# Patient Record
Sex: MALE | Race: WHITE | NOT HISPANIC OR LATINO
[De-identification: names, ages, dates, MRNs, and addresses within clinical notes are randomized per-mention and may not be internally consistent; named-entity substitution may affect disease eponyms.]

---

## 2023-02-25 PROBLEM — Z00.00 ENCOUNTER FOR PREVENTIVE HEALTH EXAMINATION: Status: ACTIVE | Noted: 2023-02-25

## 2023-02-27 ENCOUNTER — APPOINTMENT (OUTPATIENT)
Dept: ORTHOPEDIC SURGERY | Facility: CLINIC | Age: 73
End: 2023-02-27
Payer: MEDICARE

## 2023-02-27 VITALS — BODY MASS INDEX: 23.34 KG/M2 | WEIGHT: 154 LBS | HEIGHT: 68 IN

## 2023-02-27 DIAGNOSIS — E78.00 PURE HYPERCHOLESTEROLEMIA, UNSPECIFIED: ICD-10-CM

## 2023-02-27 DIAGNOSIS — J44.9 CHRONIC OBSTRUCTIVE PULMONARY DISEASE, UNSPECIFIED: ICD-10-CM

## 2023-02-27 PROCEDURE — 99204 OFFICE O/P NEW MOD 45 MIN: CPT

## 2023-02-27 PROCEDURE — 73590 X-RAY EXAM OF LOWER LEG: CPT | Mod: RT

## 2023-02-27 RX ORDER — OXYCODONE AND ACETAMINOPHEN 5; 325 MG/1; MG/1
5-325 TABLET ORAL
Qty: 30 | Refills: 0 | Status: ACTIVE | COMMUNITY
Start: 2023-02-27 | End: 1900-01-01

## 2023-02-27 NOTE — ASSESSMENT
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  Did well until this injury 4 days ago while skiing.  Splinted in [place] here for follow-up.  X-ray showing nondisplaced shaft fracture immediately proximal to the old healed fracture.  Continued nonoperative treatment advised.  This appears stable 4 days out from injury with simple AO splint.  Discussed witching to long-leg splint but I think reasonable to remain with this immobilization for now.  Nonweightbearing with crutches.  Aspirin for DVT prophylaxis.  Percocet renewed.  Advise repeat x-ray in a week with the foot and ankle specialist.

## 2023-02-27 NOTE — HISTORY OF PRESENT ILLNESS
[Sudden] : sudden [9] : 9 [8] : 8 [Localized] : localized [Constant] : constant [Rest] : rest [Walking] : walking [] : yes [de-identified] : 02/27/2023 pt presents here today with right calf pain after skiing in [place] and fell on 02/23/23 [FreeTextEntry1] : right calf  [FreeTextEntry5] : fell while skiing  [FreeTextEntry9] : oxycodone [de-identified] : x rays done at [facility] [de-identified] : nothing

## 2023-02-27 NOTE — IMAGING
[de-identified] : RLE skin intact\par TTP midshaft tibia\par NVID\par Calf supple [Right] : right tibia/fibula [The fracture is in acceptable alignment. There is progression in healing seen] : The fracture is in acceptable alignment. There is progression in healing seen

## 2023-03-06 ENCOUNTER — APPOINTMENT (OUTPATIENT)
Dept: ORTHOPEDIC SURGERY | Facility: CLINIC | Age: 73
End: 2023-03-06
Payer: MEDICARE

## 2023-03-06 VITALS — HEIGHT: 68 IN | WEIGHT: 155 LBS | BODY MASS INDEX: 23.49 KG/M2

## 2023-03-06 DIAGNOSIS — S82.201A UNSPECIFIED FRACTURE OF SHAFT OF RIGHT TIBIA, INITIAL ENCOUNTER FOR CLOSED FRACTURE: ICD-10-CM

## 2023-03-06 PROCEDURE — 99214 OFFICE O/P EST MOD 30 MIN: CPT | Mod: 25

## 2023-03-06 PROCEDURE — L4361: CPT | Mod: RT,KX

## 2023-03-06 PROCEDURE — 27750 TREATMENT OF TIBIA FRACTURE: CPT

## 2023-03-06 NOTE — PHYSICAL EXAM
[Right] : right foot and ankle [5___] : [facility] 5[unfilled]/5 [2+] : dorsalis pedis pulse: 2+ [] : ambulation with crutches [FreeTextEntry3] : no sig swelling\par prior coverage procedures no activity infection [FreeTextEntry8] : ttp anterior tibia\par no sig ankle ttp [de-identified] : plantar flexion 20 degrees [TWNoteComboBox7] : dorsiflexion 5 degrees

## 2023-03-06 NOTE — DATA REVIEWED
[Outside X-rays] : outside x-rays [Right] : of the right [Lower Extremities] : lower extremities [I reviewed the films/CD and additionally noted] : I reviewed the films/CD and additionally noted [FreeTextEntry1] : non displaced tibia shaft fx just at and prox to prior fx

## 2023-03-06 NOTE — ASSESSMENT
[FreeTextEntry1] : wbat\par cam boot\par ice/elevate\par nsaids/tylenol prn\par advised to be off narcs this far from injury\par f/up 2 wks w/ tibia xray

## 2023-03-06 NOTE — HISTORY OF PRESENT ILLNESS
[Right Leg] : right leg [9] : 9 [8] : 8 [Dull/Aching] : dull/aching [Localized] : localized [Intermittent] : intermittent [de-identified] : 03/06/2023:   pain after skiing on 2/23. saw tx and tx in splint. h/o prior open tibia fx tx by dr gregory c/b infection. was doing well un until skiing injury. denies dm. 1ppd -- stopped last month. \par  [] : Post Surgical Visit: no [FreeTextEntry1] : R

## 2023-03-22 ENCOUNTER — APPOINTMENT (OUTPATIENT)
Dept: ORTHOPEDIC SURGERY | Facility: CLINIC | Age: 73
End: 2023-03-22

## 2024-08-01 ENCOUNTER — NON-APPOINTMENT (OUTPATIENT)
Age: 74
End: 2024-08-01

## 2025-01-18 ENCOUNTER — NON-APPOINTMENT (OUTPATIENT)
Age: 75
End: 2025-01-18

## 2025-04-23 ENCOUNTER — APPOINTMENT (OUTPATIENT)
Facility: CLINIC | Age: 75
End: 2025-04-23
Payer: MEDICARE

## 2025-04-23 VITALS
SYSTOLIC BLOOD PRESSURE: 130 MMHG | BODY MASS INDEX: 24.25 KG/M2 | HEART RATE: 60 BPM | WEIGHT: 160 LBS | TEMPERATURE: 98.3 F | RESPIRATION RATE: 17 BRPM | DIASTOLIC BLOOD PRESSURE: 77 MMHG | HEIGHT: 68 IN | OXYGEN SATURATION: 94 %

## 2025-04-23 DIAGNOSIS — Z87.891 PERSONAL HISTORY OF NICOTINE DEPENDENCE: ICD-10-CM

## 2025-04-23 DIAGNOSIS — J44.9 CHRONIC OBSTRUCTIVE PULMONARY DISEASE, UNSPECIFIED: ICD-10-CM

## 2025-04-23 DIAGNOSIS — E78.00 PURE HYPERCHOLESTEROLEMIA, UNSPECIFIED: ICD-10-CM

## 2025-04-23 DIAGNOSIS — F01.50 VASCULAR DEMENTIA W/OUT BEHAVIORAL DISTURBANCE: ICD-10-CM

## 2025-04-23 PROCEDURE — 94060 EVALUATION OF WHEEZING: CPT

## 2025-04-23 PROCEDURE — 94727 GAS DIL/WSHOT DETER LNG VOL: CPT

## 2025-04-23 PROCEDURE — 94729 DIFFUSING CAPACITY: CPT

## 2025-04-23 PROCEDURE — 99205 OFFICE O/P NEW HI 60 MIN: CPT | Mod: 25

## 2025-04-23 RX ORDER — FLUTICASONE FUROATE, UMECLIDINIUM BROMIDE AND VILANTEROL TRIFENATATE 200; 62.5; 25 UG/1; UG/1; UG/1
200-62.5-25 POWDER RESPIRATORY (INHALATION) DAILY
Qty: 1 | Refills: 5 | Status: ACTIVE | COMMUNITY
Start: 2025-04-23 | End: 1900-01-01

## 2025-04-23 RX ORDER — ALBUTEROL SULFATE 90 UG/1
108 (90 BASE) INHALANT RESPIRATORY (INHALATION)
Refills: 0 | Status: ACTIVE | COMMUNITY

## 2025-04-23 RX ORDER — ASPIRIN 81 MG/1
81 TABLET ORAL
Refills: 0 | Status: ACTIVE | COMMUNITY

## 2025-04-23 RX ORDER — MIRABEGRON 50 MG/1
50 TABLET, EXTENDED RELEASE ORAL
Refills: 0 | Status: ACTIVE | COMMUNITY

## 2025-04-23 RX ORDER — DONEPEZIL HYDROCHLORIDE 10 MG/1
10 TABLET ORAL
Refills: 0 | Status: ACTIVE | COMMUNITY

## 2025-04-23 RX ORDER — ROSUVASTATIN CALCIUM 20 MG/1
20 TABLET, FILM COATED ORAL
Refills: 0 | Status: ACTIVE | COMMUNITY

## 2025-04-23 RX ORDER — GABAPENTIN 300 MG/1
300 CAPSULE ORAL
Refills: 0 | Status: ACTIVE | COMMUNITY

## 2025-04-23 RX ORDER — MEMANTINE HYDROCHLORIDE 28 MG/1
28 CAPSULE, EXTENDED RELEASE ORAL
Refills: 0 | Status: ACTIVE | COMMUNITY

## 2025-04-30 ENCOUNTER — APPOINTMENT (OUTPATIENT)
Dept: CT IMAGING | Facility: CLINIC | Age: 75
End: 2025-04-30

## 2025-04-30 PROCEDURE — 71250 CT THORAX DX C-: CPT | Mod: TC

## 2025-05-06 ENCOUNTER — TRANSCRIPTION ENCOUNTER (OUTPATIENT)
Age: 75
End: 2025-05-06

## 2025-05-06 ENCOUNTER — OUTPATIENT (OUTPATIENT)
Dept: OUTPATIENT SERVICES | Facility: HOSPITAL | Age: 75
LOS: 1 days | End: 2025-05-06
Payer: MEDICARE

## 2025-05-06 DIAGNOSIS — G47.33 OBSTRUCTIVE SLEEP APNEA (ADULT) (PEDIATRIC): ICD-10-CM

## 2025-05-06 PROCEDURE — G0400: CPT | Mod: 26

## 2025-05-06 PROCEDURE — 95800 SLP STDY UNATTENDED: CPT

## 2025-06-04 ENCOUNTER — APPOINTMENT (OUTPATIENT)
Facility: CLINIC | Age: 75
End: 2025-06-04
Payer: MEDICARE

## 2025-06-04 VITALS
OXYGEN SATURATION: 96 % | WEIGHT: 157 LBS | RESPIRATION RATE: 16 BRPM | SYSTOLIC BLOOD PRESSURE: 101 MMHG | TEMPERATURE: 97.5 F | DIASTOLIC BLOOD PRESSURE: 65 MMHG | HEIGHT: 68 IN | BODY MASS INDEX: 23.79 KG/M2 | HEART RATE: 56 BPM

## 2025-06-04 DIAGNOSIS — S82.201A UNSPECIFIED FRACTURE OF SHAFT OF RIGHT TIBIA, INITIAL ENCOUNTER FOR CLOSED FRACTURE: ICD-10-CM

## 2025-06-04 DIAGNOSIS — Z87.891 PERSONAL HISTORY OF NICOTINE DEPENDENCE: ICD-10-CM

## 2025-06-04 DIAGNOSIS — J44.9 CHRONIC OBSTRUCTIVE PULMONARY DISEASE, UNSPECIFIED: ICD-10-CM

## 2025-06-04 DIAGNOSIS — E78.00 PURE HYPERCHOLESTEROLEMIA, UNSPECIFIED: ICD-10-CM

## 2025-06-04 DIAGNOSIS — F01.50 VASCULAR DEMENTIA W/OUT BEHAVIORAL DISTURBANCE: ICD-10-CM

## 2025-06-04 DIAGNOSIS — B37.0 CANDIDAL STOMATITIS: ICD-10-CM

## 2025-06-04 PROCEDURE — 99214 OFFICE O/P EST MOD 30 MIN: CPT

## 2025-06-04 RX ORDER — CLOTRIMAZOLE 10 MG/1
10 LOZENGE ORAL DAILY
Qty: 1 | Refills: 5 | Status: ACTIVE | COMMUNITY
Start: 2025-06-04 | End: 1900-01-01

## 2025-06-18 ENCOUNTER — APPOINTMENT (OUTPATIENT)
Dept: RADIOLOGY | Facility: HOSPITAL | Age: 75
End: 2025-06-18

## 2025-06-18 ENCOUNTER — OUTPATIENT (OUTPATIENT)
Dept: OUTPATIENT SERVICES | Facility: HOSPITAL | Age: 75
LOS: 1 days | End: 2025-06-18
Payer: COMMERCIAL

## 2025-06-18 DIAGNOSIS — Z00.00 ENCOUNTER FOR GENERAL ADULT MEDICAL EXAMINATION WITHOUT ABNORMAL FINDINGS: ICD-10-CM

## 2025-06-18 DIAGNOSIS — G91.2 (IDIOPATHIC) NORMAL PRESSURE HYDROCEPHALUS: ICD-10-CM

## 2025-06-18 PROCEDURE — 36415 COLL VENOUS BLD VENIPUNCTURE: CPT

## 2025-06-18 PROCEDURE — 97161 PT EVAL LOW COMPLEX 20 MIN: CPT

## 2025-06-18 PROCEDURE — 82945 GLUCOSE OTHER FLUID: CPT

## 2025-06-18 PROCEDURE — 84157 ASSAY OF PROTEIN OTHER: CPT

## 2025-06-18 PROCEDURE — 62329 THER SPI PNXR CSF FLUOR/CT: CPT

## 2025-06-18 PROCEDURE — 85049 AUTOMATED PLATELET COUNT: CPT

## 2025-06-18 PROCEDURE — 85610 PROTHROMBIN TIME: CPT

## 2025-06-18 PROCEDURE — 89051 BODY FLUID CELL COUNT: CPT

## 2025-06-18 NOTE — PHYSICAL THERAPY INITIAL EVALUATION ADULT - PERTINENT HX OF CURRENT PROBLEM, REHAB EVAL
Patient presented to outpatient radiology for High volume LP procedure. Patient participate in TUG test pre and post procedure.

## 2025-06-18 NOTE — PHYSICAL THERAPY INITIAL EVALUATION ADULT - TIMED UP AND GO TEST, REHAB EVAL
Pre High Volume: 1st Trial: 13.80 seconds, 2nd trial: 15.03 seconds, 3rd trial: 15.16 seconds. Average : 14.6 seconds. Post High Volume LP: 1st trial: 13.78 seconds, 2nd trial: 15.03 seconds, 3rd trial: 13.43 seconds. Average: 14.8 seconds. Average TUG score pre and post high volume LP remained grossly same. Analysis: performs Sit to stand independently, Pt. ambulated using SC, ambulates with wide MAC, mild hyperextension of R knee at terminal stance, takes 4 steps to make 180 degree turns

## 2025-06-18 NOTE — PHYSICAL THERAPY INITIAL EVALUATION ADULT - ADDITIONAL COMMENTS
Patient lives with wife in a condo with no stairs to negotiate; uses SC at baseline for community ambulation.